# Patient Record
Sex: FEMALE | Race: WHITE | URBAN - METROPOLITAN AREA
[De-identification: names, ages, dates, MRNs, and addresses within clinical notes are randomized per-mention and may not be internally consistent; named-entity substitution may affect disease eponyms.]

---

## 2017-04-03 DIAGNOSIS — F32.0 MILD SINGLE CURRENT EPISODE OF MAJOR DEPRESSIVE DISORDER (H): ICD-10-CM

## 2017-04-03 DIAGNOSIS — F41.1 GAD (GENERALIZED ANXIETY DISORDER): ICD-10-CM

## 2017-04-04 ENCOUNTER — MYC MEDICAL ADVICE (OUTPATIENT)
Dept: FAMILY MEDICINE | Facility: CLINIC | Age: 22
End: 2017-04-04

## 2017-04-04 NOTE — TELEPHONE ENCOUNTER
Pending Prescriptions:                       Disp   Refills    sertraline (ZOLOFT) 50 MG tablet [Pharmacy*30 tab*0        Sig: TAKE ONE-HALF TABLET BY MOUTH ONCE DAILY FOR 1-2           WEEKS, THEN INCREASE TO ONE TABLET ONCE DAILY           Last Written Prescription Date: 11/04/2016  Last Fill Quantity: 30; # refills: 1  Last Office Visit with [facility], [facility] or [facility] prescribing provider:  11/04/2016        Last PHQ-9 score on record=   PHQ-9 SCORE 11/4/2016   Total Score 22       Lab Results   Component Value Date    AST 13 12/11/2015     Lab Results   Component Value Date    ALT 20 12/11/2015

## 2017-04-04 NOTE — TELEPHONE ENCOUNTER
Last OV 11/2016.  Patient was to follow up in 4-6 weeks.  See 3/13 Mychart encounter.  Patient informed about follow up.  Patient informed PN ok with telephone visit or Evisit - no follow through from patient.    Sent patient a [employer] message asking her to schedule appointment.  Nadeen Swanson RN

## 2017-04-07 NOTE — TELEPHONE ENCOUNTER
Patient has not read [employer] message  Left message for patient to callback with mom who answered the phone.  Nayeli MERCADO RN

## 2017-04-11 NOTE — TELEPHONE ENCOUNTER
Will have Julien call patient to schedule TE  Nadeen Swanson RN    Note from 3/13/17 [employer]:  Dr. Brian Lambert is willing to do an E-visit or a telephone visit.     E-visit is done via [employer].  You would need to resend the message below through the E-visit tab located in [employer].  These charges are not covered by some insurance plans     Telephone visit:  The charge for this type of visit will vary.  These charges are not covered by some insurance plans.   Below is a list of charges:     The first 10 minutes is $30   11 to 20 minutes is $59   21 to 30 minutes is $85     If you decide you want to do a telephone visit please let me know and we will schedule the appointment otherwise you can send you message as an E-visit.  Let us know if you have any further questions.  Thank you, RENNY Senior

## 2017-04-11 NOTE — TELEPHONE ENCOUNTER
Pt is calling back and would like to know if ph appt is possible because she lives 4 hrs. Please advise

## 2017-04-12 ENCOUNTER — VIRTUAL VISIT (OUTPATIENT)
Dept: FAMILY MEDICINE | Facility: CLINIC | Age: 22
End: 2017-04-12
Payer: COMMERCIAL

## 2017-04-12 DIAGNOSIS — F32.0 MILD SINGLE CURRENT EPISODE OF MAJOR DEPRESSIVE DISORDER (H): ICD-10-CM

## 2017-04-12 DIAGNOSIS — F41.1 GAD (GENERALIZED ANXIETY DISORDER): ICD-10-CM

## 2017-04-12 PROCEDURE — 99441 ZZC PHYSICIAN TELEPHONE EVALUATION 5-10 MIN: CPT | Performed by: FAMILY MEDICINE

## 2017-04-12 NOTE — MR AVS SNAPSHOT
After Visit Summary   4/12/2017    Torie Dennis    MRN: [id number]           Patient Information     Date Of Birth          1995        Visit Information        Provider Department      4/12/2017 12:30 PM Miri Torres, DO [facility]        Today's Diagnoses     THEE (generalized anxiety disorder)        Mild single current episode of major depressive disorder (H)           Follow-ups after your visit        Who to contact     If you have questions or need follow up information about today's clinic visit or your schedule please contact [facility] directly at [phone].  Normal or non-critical lab and imaging results will be communicated to you by [employer], letter or phone within 4 business days after the clinic has received the results. If you do not hear from us within 7 days, please contact the clinic through Ev[employer] or phone. If you have a critical or abnormal lab result, we will notify you by phone as soon as possible.  Submit refill requests through [employer] or call your pharmacy and they will forward the refill request to us. Please allow 3 business days for your refill to be completed.          Additional Information About Your Visit        MyChart Information     [employer] gives you secure access to your electronic health record. If you see a primary care provider, you can also send messages to your care team and make appointments. If you have questions, please call your primary care clinic.  If you do not have a primary care provider, please call [phone] and they will assist you.        Care EveryWhere ID     This is your Care EveryWhere ID. This could be used by other organizations to access your [place] medical records  [id number]         Blood Pressure from Last 3 Encounters:   11/04/16 106/74   12/11/15 111/72   04/30/15 115/73    Weight from Last 3 Encounters:   11/04/16 125 lb 3.2 oz (56.8 kg)   12/11/15 128 lb 1.6 oz (58.1 kg)   04/30/15 128 lb (58.1  kg)              Today, you had the following     No orders found for display         Today's Medication Changes          These changes are accurate as of: 4/12/17 12:59 PM.  If you have any questions, ask your nurse or doctor.               These medicines have changed or have updated prescriptions.        Dose/Directions    sertraline 50 MG tablet   Commonly known as:  ZOLOFT   This may have changed:    - how much to take  - how to take this  - when to take this  - additional instructions   Used for:  THEE (generalized anxiety disorder), Mild single current episode of major depressive disorder (H)        Dose:  100 mg   Take 2 tablets (100 mg) by mouth daily   Quantity:  60 tablet   Refills:  5            Where to get your medicines      These medications were sent to [id number] - [place], [place] - [address] AT [facility] [address], [place] [address]     Phone:  [phone]     sertraline 50 MG tablet                Primary Care Provider    None Specified       No primary provider on file.        Thank you!     Thank you for choosing [facility]  for your care. Our goal is always to provide you with excellent care. Hearing back from our patients is one way we can continue to improve our services. Please take a few minutes to complete the written survey that you may receive in the mail after your visit with us. Thank you!             Your Updated Medication List - Protect others around you: Learn how to safely use, store and throw away your medicines at www.disposemymeds.org.          This list is accurate as of: 4/12/17 12:59 PM.  Always use your most recent med list.                   Brand Name Dispense Instructions for use    minocycline 100 MG tablet    DYNACIN    120 tablet    Take 1 tablet (100 mg) by mouth 2 times daily Appointment needed before any further refills       sertraline 50 MG tablet    ZOLOFT    60 tablet    Take 2 tablets (100  mg) by mouth daily

## 2017-04-12 NOTE — PROGRESS NOTES
"Torie Dennis is a 22 year old female who is being evaluated via a telephone visit.      The patient has been notified of following:     \"This telephone visit will be conducted via a call between you and your physician/provider. We have found that certain health care needs can be provided without the need for a physical exam.  This service lets us provide the care you need with a short phone conversation.  If a prescription is necessary we can send it directly to your pharmacy.  If lab work is needed we can place an order for that and you can then stop by our lab to have the test done at a later time.    We will bill your insurance company for this service.  Please check with your medical insurance if this type of visit is covered. You may be responsible for the cost of this type of visit if insurance coverage is denied.  The typical cost is $30 (10min), $59 (11-20min) and $85 (21-30min).  Most often these visits are shorter than 10 minutes.    If during the course of the call the physician/provider feels a telephone visit is not appropriate, you will not be charged for this service.\"       Consent has been obtained for this service by 2 care team members: yes. See the scanned image in the medical record.    Torie Dennis complains of  Telephone      I have reviewed and updated the patient's Past Medical History, Social History, Family History and Medication List.    ALLERGIES  Paola Portillo, MA  [facility]     (MA signature)    Additional provider notes:   Took the zoloft for the first 1.5 months  Noticed that the depression and low feelings were improved within 3 weeks.  Stopped taking the medication because it made her feel tired and scattered all the time.  Yawning all the time  Tired by 8pm -     Went off the medication end of December -   Stayed off med  In February wanted to restart zoloft - because mood is down  Taking 50mg currently  Tried taking med both AM and PM and it didn't help " with the fatigue        Assessment/Plan:  (F41.1) THEE (generalized anxiety disorder)  Comment: anxiety and depressed mood  zoloft working but wonders about side effect of possible fatigue  Will increase dose because feel like this could be due to her depression and not a side effect  Will go to 100mg - Rx faxed  Discussed option of having her try lexapro or wellbutrin in the future if this does not work  Plan:      (F32.0) Mild single current episode of major depressive disorder (H)  Comment: as above  Plan:  a       I have reviewed the note as documented above.  This accurately captures the substance of my conversation with the patient,  Torie Dennis     Total time of call between patient and provider was 10 minutes

## 2017-04-13 ASSESSMENT — PATIENT HEALTH QUESTIONNAIRE - PHQ9: SUM OF ALL RESPONSES TO PHQ QUESTIONS 1-9: 15

## 2017-05-26 ENCOUNTER — MYC MEDICAL ADVICE (OUTPATIENT)
Dept: FAMILY MEDICINE | Facility: CLINIC | Age: 22
End: 2017-05-26

## 2017-05-30 ENCOUNTER — TELEPHONE (OUTPATIENT)
Dept: FAMILY MEDICINE | Facility: CLINIC | Age: 22
End: 2017-05-30

## 2017-05-30 NOTE — TELEPHONE ENCOUNTER
Huddled with PN  Her schedule is full tomorrow; can't do office or phone visit.  Then not in office 6/1  E-Visit would be best at this time.  Left detailed VM informing pt  Nayeli MERCADO RN

## 2017-05-30 NOTE — TELEPHONE ENCOUNTER
Reason for Call:  Other call back    Detailed comments: pt wants a phone visit with the  Since   She cannot come in on June 1. The phone visit has to be before or on  June 1.[place] going [place] 3 [place].    Phone Number Patient can be reached at: Home number on file [phone] (home)    Best Time: any    Can we leave a detailed message on this number? YES    Call taken on 5/30/2017 at 4:56 PM by Jessi Shirley

## 2017-06-17 ENCOUNTER — MYC MEDICAL ADVICE (OUTPATIENT)
Dept: FAMILY MEDICINE | Facility: CLINIC | Age: 22
End: 2017-06-17

## 2017-06-19 NOTE — TELEPHONE ENCOUNTER
PN,  FYI:  See below message  Pt has telephone visit scheduled with you for 6/23/2017 at 3pm.  Nayeli MERCADO RN

## 2017-06-23 ENCOUNTER — VIRTUAL VISIT (OUTPATIENT)
Dept: FAMILY MEDICINE | Facility: CLINIC | Age: 22
End: 2017-06-23
Payer: COMMERCIAL

## 2017-06-23 DIAGNOSIS — F41.1 GAD (GENERALIZED ANXIETY DISORDER): Primary | ICD-10-CM

## 2017-06-23 DIAGNOSIS — F34.1 DYSTHYMIA: ICD-10-CM

## 2017-06-23 PROCEDURE — 99441 ZZC PHYSICIAN TELEPHONE EVALUATION 5-10 MIN: CPT | Performed by: FAMILY MEDICINE

## 2017-06-23 RX ORDER — ESCITALOPRAM OXALATE 10 MG/1
10 TABLET ORAL DAILY
Qty: 30 TABLET | Refills: 1 | Status: SHIPPED | OUTPATIENT
Start: 2017-06-23 | End: 2017-10-02

## 2017-06-23 NOTE — PROGRESS NOTES
"Torie Dennis is a 22 year old female who is being evaluated via a telephone visit.      The patient has been notified of following:     \"This telephone visit will be conducted via a call between you and your physician/provider. We have found that certain health care needs can be provided without the need for a physical exam.  This service lets us provide the care you need with a short phone conversation.  If a prescription is necessary we can send it directly to your pharmacy.  If lab work is needed we can place an order for that and you can then stop by our lab to have the test done at a later time.    We will bill your insurance company for this service.  Please check with your medical insurance if this type of visit is covered. You may be responsible for the cost of this type of visit if insurance coverage is denied.  The typical cost is $30 (10min), $59 (11-20min) and $85 (21-30min).  Most often these visits are shorter than 10 minutes.    If during the course of the call the physician/provider feels a telephone visit is not appropriate, you will not be charged for this service.\"       Consent has been obtained for this service by 2 care team members: yes. See the scanned image in the medical record.    Torie Dennis complains of  Telephone (Depression)      I have reviewed and updated the patient's Past Medical History, Social History, Family History and Medication List.    ALLERGIES  Nuts    NAMITA Alonzo  (MA signature)    Additional provider notes:    Since her last visit the zoloft has not helped  Felt like the higher dose made her feel spacey and other side effects  Emotionally felt like she was better.  Took the higher dose for a little over a month - stopped at that time because it wasn't helping.  Low energy and lack of focus persisting but mood was better.      Assessment/Plan:  (F41.1) THEE (generalized anxiety disorder)  (primary encounter diagnosis)  Comment: stopped the zoloft  Will try the " lexapro -   If working call for refills (can do PHQ and THEE by mychart)  If the med not working pt to schedule another telephone visit.  Plan: escitalopram (LEXAPRO) 10 MG tablet             (F34.1) Dysthymia  Comment:    Plan: escitalopram (LEXAPRO) 10 MG tablet                I have reviewed the note as documented above.  This accurately captures the substance of my conversation with the patient,   Torie Dennis     Total time of call between patient and provider was 10 minutes

## 2017-06-23 NOTE — MR AVS SNAPSHOT
After Visit Summary   6/23/2017    Torie Dennis    MRN: [id number]           Patient Information     Date Of Birth          1995        Visit Information        Provider Department      6/23/2017 3:00 PM Miri Torres, DO [facility]        Today's Diagnoses     THEE (generalized anxiety disorder)    -  1    Dysthymia           Follow-ups after your visit        Who to contact     If you have questions or need follow up information about today's clinic visit or your schedule please contact [facility] directly at [phone].  Normal or non-critical lab and imaging results will be communicated to you by [employer]on[employer], letter or phone within 4 business days after the clinic has received the results. If you do not hear from us within 7 days, please contact the clinic through [employer] or phone. If you have a critical or abnormal lab result, we will notify you by phone as soon as possible.  Submit refill requests through [employer] or call your pharmacy and they will forward the refill request to us. Please allow 3 business days for your refill to be completed.          Additional Information About Your Visit        MyChart Information     [employer] gives you secure access to your electronic health record. If you see a primary care provider, you can also send messages to your care team and make appointments. If you have questions, please call your primary care clinic.  If you do not have a primary care provider, please call [phone] and they will assist you.        Care EveryWhere ID     This is your Care EveryWhere ID. This could be used by other organizations to access your [place] medical records  [id number]         Blood Pressure from Last 3 Encounters:   11/04/16 106/74   12/11/15 111/72   04/30/15 115/73    Weight from Last 3 Encounters:   11/04/16 125 lb 3.2 oz (56.8 kg)   12/11/15 128 lb 1.6 oz (58.1 kg)   04/30/15 128 lb (58.1 kg)              Today, you had the following      No orders found for display         Today's Medication Changes          These changes are accurate as of: 6/23/17  3:13 PM.  If you have any questions, ask your nurse or doctor.               Start taking these medicines.        Dose/Directions    escitalopram 10 MG tablet   Commonly known as:  LEXAPRO   Used for:  THEE (generalized anxiety disorder), Dysthymia        Dose:  10 mg   Take 1 tablet (10 mg) by mouth daily Start 1/2 tablet daily for 1-2 weeks, then increase to 1 tablet daily   Quantity:  30 tablet   Refills:  1            Where to get your medicines      These medications were sent to [id number] - [place], [place] - [address] [facility]  [place]  [address] [facility] [address]    Hours:  24-hours Phone:  [phone]     escitalopram 10 MG tablet                Primary Care Provider    None Specified       No primary provider on file.        Equal Access to Services     [facility]: Hadmitch Rome, saad rutherford, gabe kaalmajihan salinas, fabiana pinto . So [facility] [phone].    ATENCIÓN: Si habla español, tiene a burns disposición servicios gratuitos de asistencia lingüística. Llame al [phone].    We comply with applicable federal civil rights laws and [place] laws. We do not discriminate on the basis of race, color, national origin, age, disability sex, sexual orientation or gender identity.            Thank you!     Thank you for choosing [facility]  for your care. Our goal is always to provide you with excellent care. Hearing back from our patients is one way we can continue to improve our services. Please take a few minutes to complete the written survey that you may receive in the mail after your visit with us. Thank you!             Your Updated Medication List - Protect others around you: Learn how to safely use, store and throw away your medicines at www.disposemymeds.org.          This list  is accurate as of: 6/23/17  3:13 PM.  Always use your most recent med list.                   Brand Name Dispense Instructions for use Diagnosis    escitalopram 10 MG tablet    LEXAPRO    30 tablet    Take 1 tablet (10 mg) by mouth daily Start 1/2 tablet daily for 1-2 weeks, then increase to 1 tablet daily    THEE (generalized anxiety disorder), Dysthymia       minocycline 100 MG tablet    DYNACIN    120 tablet    Take 1 tablet (100 mg) by mouth 2 times daily Appointment needed before any further refills    Acne vulgaris       sertraline 50 MG tablet    ZOLOFT    60 tablet    Take 2 tablets (100 mg) by mouth daily    THEE (generalized anxiety disorder), Mild single current episode of major depressive disorder (H)

## 2017-06-24 ASSESSMENT — PATIENT HEALTH QUESTIONNAIRE - PHQ9: SUM OF ALL RESPONSES TO PHQ QUESTIONS 1-9: 15

## 2017-07-23 ENCOUNTER — MYC MEDICAL ADVICE (OUTPATIENT)
Dept: FAMILY MEDICINE | Facility: CLINIC | Age: 22
End: 2017-07-23

## 2017-07-24 NOTE — TELEPHONE ENCOUNTER
PN  Please see [employer] message below.  Do you want to do another virtual visit?  Last 6/23/17 and 4/12/17  Ndaeen Swanson RN

## 2017-08-08 ENCOUNTER — VIRTUAL VISIT (OUTPATIENT)
Dept: FAMILY MEDICINE | Facility: CLINIC | Age: 22
End: 2017-08-08
Payer: COMMERCIAL

## 2017-08-08 DIAGNOSIS — F34.1 DYSTHYMIA: Primary | ICD-10-CM

## 2017-08-08 DIAGNOSIS — F41.1 GAD (GENERALIZED ANXIETY DISORDER): ICD-10-CM

## 2017-08-08 PROCEDURE — 99442 ZZC PHYSICIAN TELEPHONE EVALUATION 11-20 MIN: CPT | Performed by: FAMILY MEDICINE

## 2017-08-08 RX ORDER — BUPROPION HYDROCHLORIDE 150 MG/1
TABLET, EXTENDED RELEASE ORAL
Qty: 60 TABLET | Refills: 1 | Status: SHIPPED | OUTPATIENT
Start: 2017-08-08 | End: 2017-10-02

## 2017-08-08 ASSESSMENT — ANXIETY QUESTIONNAIRES
GAD7 TOTAL SCORE: 12
2. NOT BEING ABLE TO STOP OR CONTROL WORRYING: MORE THAN HALF THE DAYS
5. BEING SO RESTLESS THAT IT IS HARD TO SIT STILL: SEVERAL DAYS
3. WORRYING TOO MUCH ABOUT DIFFERENT THINGS: MORE THAN HALF THE DAYS
6. BECOMING EASILY ANNOYED OR IRRITABLE: MORE THAN HALF THE DAYS
1. FEELING NERVOUS, ANXIOUS, OR ON EDGE: MORE THAN HALF THE DAYS
7. FEELING AFRAID AS IF SOMETHING AWFUL MIGHT HAPPEN: SEVERAL DAYS
IF YOU CHECKED OFF ANY PROBLEMS ON THIS QUESTIONNAIRE, HOW DIFFICULT HAVE THESE PROBLEMS MADE IT FOR YOU TO DO YOUR WORK, TAKE CARE OF THINGS AT HOME, OR GET ALONG WITH OTHER PEOPLE: SOMEWHAT DIFFICULT

## 2017-08-08 ASSESSMENT — PATIENT HEALTH QUESTIONNAIRE - PHQ9
5. POOR APPETITE OR OVEREATING: MORE THAN HALF THE DAYS
SUM OF ALL RESPONSES TO PHQ QUESTIONS 1-9: 17

## 2017-08-08 NOTE — MR AVS SNAPSHOT
After Visit Summary   8/8/2017    Torie Dennis    MRN: [id number]           Patient Information     Date Of Birth          1995        Visit Information        Provider Department      8/8/2017 3:30 PM Miri Torres, DO [facility]        Today's Diagnoses     Dysthymia    -  1    THEE (generalized anxiety disorder)           Follow-ups after your visit        Who to contact     If you have questions or need follow up information about today's clinic visit or your schedule please contact [facility] directly at [phone].  Normal or non-critical lab and imaging results will be communicated to you by Time To C[employer]hart, letter or phone within 4 business days after the clinic has received the results. If you do not hear from us within 7 days, please contact the clinic through [employer] or phone. If you have a critical or abnormal lab result, we will notify you by phone as soon as possible.  Submit refill requests through [employer] or call your pharmacy and they will forward the refill request to us. Please allow 3 business days for your refill to be completed.          Additional Information About Your Visit        MyChart Information     [employer] gives you secure access to your electronic health record. If you see a primary care provider, you can also send messages to your care team and make appointments. If you have questions, please call your primary care clinic.  If you do not have a primary care provider, please call [phone] and they will assist you.        Care EveryWhere ID     This is your Care EveryWhere ID. This could be used by other organizations to access your [place] medical records  [id number]         Blood Pressure from Last 3 Encounters:   11/04/16 106/74   12/11/15 111/72   04/30/15 115/73    Weight from Last 3 Encounters:   11/04/16 125 lb 3.2 oz (56.8 kg)   12/11/15 128 lb 1.6 oz (58.1 kg)   04/30/15 128 lb (58.1 kg)              Today, you had the following      No orders found for display         Today's Medication Changes          These changes are accurate as of: 8/8/17  4:12 PM.  If you have any questions, ask your nurse or doctor.               Start taking these medicines.        Dose/Directions    buPROPion 150 MG 12 hr tablet   Commonly known as:  WELLBUTRIN SR   Used for:  Dysthymia, THEE (generalized anxiety disorder)        Take 1 tablet once daily and increase to 1 tablet twice daily after 4 to 7 days   Quantity:  60 tablet   Refills:  1            Where to get your medicines      These medications were sent to [id number] - [place], [place] - [address] AT [address] [facility]  [address], [facility] [place] [address]    Hours:  24-hours Phone:  [phone]     buPROPion 150 MG 12 hr tablet                Primary Care Provider Office Phone # Fax #    Miri GARCIA DO Brian [phone] [address] [address]        Equal Access to Services     [facility]: Hadii aad ku hadasho Soomaali, waaxda luqadaha, qaybta kaalmada adeegyada, waxay idiin hayaan brittney pinto . So [facility] [phone].    ATENCIÓN: Si habla español, tiene a burns disposición servicios gratuitos de asistencia lingüística. [facility] [phone].    We comply with applicable federal civil rights laws and [place] laws. We do not discriminate on the basis of race, color, national origin, age, disability sex, sexual orientation or gender identity.            Thank you!     Thank you for choosing [facility]  for your care. Our goal is always to provide you with excellent care. Hearing back from our patients is one way we can continue to improve our services. Please take a few minutes to complete the written survey that you may receive in the mail after your visit with us. Thank you!             Your Updated Medication List - Protect others around you: Learn how to safely use, store and throw away your medicines at  www.disposemymeds.org.          This list is accurate as of: 8/8/17  4:12 PM.  Always use your most recent med list.                   Brand Name Dispense Instructions for use Diagnosis    buPROPion 150 MG 12 hr tablet    WELLBUTRIN SR    60 tablet    Take 1 tablet once daily and increase to 1 tablet twice daily after 4 to 7 days    Dysthymia, THEE (generalized anxiety disorder)       escitalopram 10 MG tablet    LEXAPRO    30 tablet    Take 1 tablet (10 mg) by mouth daily Start 1/2 tablet daily for 1-2 weeks, then increase to 1 tablet daily    THEE (generalized anxiety disorder), Dysthymia

## 2017-08-08 NOTE — PROGRESS NOTES
"Torie Dennis is a 22 year old female who is being evaluated via a telephone visit.      The patient has been notified of following:     \"This telephone visit will be conducted via a call between you and your physician/provider. We have found that certain health care needs can be provided without the need for a physical exam.  This service lets us provide the care you need with a short phone conversation.  If a prescription is necessary we can send it directly to your pharmacy.  If lab work is needed we can place an order for that and you can then stop by our lab to have the test done at a later time.    We will bill your insurance company for this service.  Please check with your medical insurance if this type of visit is covered. You may be responsible for the cost of this type of visit if insurance coverage is denied.  The typical cost is $30 (10min), $59 (11-20min) and $85 (21-30min).  Most often these visits are shorter than 10 minutes.    If during the course of the call the physician/provider feels a telephone visit is not appropriate, you will not be charged for this service.\"       Consent has been obtained for this service by 2 care team members: yes. See the scanned image in the medical record.    Torie Dennis complains of  Anxiety      I have reviewed and updated the patient's Past Medical History, Social History, Family History and Medication List.    ALLERGIES  Nuts    NAMITA Alonzo  (MA signature)    Additional provider notes:      Tried the lexapro -   Took the medication for 2 weeks and had side effects of fatigue and felt off.  Was unproductive.  Was not able to sleep on the medication.   Was staying in bed and struggling to get out of bed.  Also tried zoloft.    Symptoms -   Insomnia - also had daytime sleepiness  Has been nauseous - unsure if it was the lexapro.  Lost 3-5 pounds.  Trouble concentrating    Has some anxiety - aggravating outside source and depending on day can cope or not able " to cope with it.  At other times will have anxiety without any underlying cause.      ETOH - socially    Will be starting school again         Assessment/Plan:  (F34.1) Dysthymia  (primary encounter diagnosis)  Comment: will try other med as SSRI do not seem to help  No signs of bipolar but reviewed with patient  Plan: buPROPion (WELLBUTRIN SR) 150 MG 12 hr tablet         Call for med refill if working or phone visit to discuss medication change     (F41.1) THEE (generalized anxiety disorder)  Comment:    Plan: buPROPion (WELLBUTRIN SR) 150 MG 12 hr tablet                I have reviewed the note as documented above.  This accurately captures the substance of my conversation with the patient,  Torie Dennis     Total time of call between patient and provider was 15 minutes

## 2017-08-09 ASSESSMENT — ANXIETY QUESTIONNAIRES: GAD7 TOTAL SCORE: 12

## 2017-10-02 ENCOUNTER — TELEPHONE (OUTPATIENT)
Dept: FAMILY MEDICINE | Facility: CLINIC | Age: 22
End: 2017-10-02

## 2017-10-02 ENCOUNTER — OFFICE VISIT (OUTPATIENT)
Dept: FAMILY MEDICINE | Facility: CLINIC | Age: 22
End: 2017-10-02
Payer: COMMERCIAL

## 2017-10-02 VITALS
HEIGHT: 67 IN | HEART RATE: 58 BPM | OXYGEN SATURATION: 100 % | BODY MASS INDEX: 19.78 KG/M2 | DIASTOLIC BLOOD PRESSURE: 65 MMHG | RESPIRATION RATE: 16 BRPM | WEIGHT: 126 LBS | TEMPERATURE: 97.4 F | SYSTOLIC BLOOD PRESSURE: 101 MMHG

## 2017-10-02 DIAGNOSIS — Z00.00 ROUTINE GENERAL MEDICAL EXAMINATION AT A HEALTH CARE FACILITY: Primary | ICD-10-CM

## 2017-10-02 DIAGNOSIS — Z23 NEED FOR TDAP VACCINATION: ICD-10-CM

## 2017-10-02 DIAGNOSIS — R41.840 LACK OF CONCENTRATION: Primary | ICD-10-CM

## 2017-10-02 DIAGNOSIS — Z23 NEED FOR INFLUENZA VACCINATION: ICD-10-CM

## 2017-10-02 DIAGNOSIS — F34.1 DYSTHYMIA: ICD-10-CM

## 2017-10-02 DIAGNOSIS — R41.840 LACK OF CONCENTRATION: ICD-10-CM

## 2017-10-02 DIAGNOSIS — F41.1 GAD (GENERALIZED ANXIETY DISORDER): ICD-10-CM

## 2017-10-02 PROCEDURE — 90715 TDAP VACCINE 7 YRS/> IM: CPT | Performed by: PHYSICIAN ASSISTANT

## 2017-10-02 PROCEDURE — 90686 IIV4 VACC NO PRSV 0.5 ML IM: CPT | Performed by: PHYSICIAN ASSISTANT

## 2017-10-02 PROCEDURE — 90472 IMMUNIZATION ADMIN EACH ADD: CPT | Performed by: PHYSICIAN ASSISTANT

## 2017-10-02 PROCEDURE — 99395 PREV VISIT EST AGE 18-39: CPT | Mod: 25 | Performed by: PHYSICIAN ASSISTANT

## 2017-10-02 PROCEDURE — 90471 IMMUNIZATION ADMIN: CPT | Performed by: PHYSICIAN ASSISTANT

## 2017-10-02 PROCEDURE — 99213 OFFICE O/P EST LOW 20 MIN: CPT | Mod: 25 | Performed by: PHYSICIAN ASSISTANT

## 2017-10-02 ASSESSMENT — ANXIETY QUESTIONNAIRES
1. FEELING NERVOUS, ANXIOUS, OR ON EDGE: NEARLY EVERY DAY
GAD7 TOTAL SCORE: 18
3. WORRYING TOO MUCH ABOUT DIFFERENT THINGS: NEARLY EVERY DAY
7. FEELING AFRAID AS IF SOMETHING AWFUL MIGHT HAPPEN: NOT AT ALL
2. NOT BEING ABLE TO STOP OR CONTROL WORRYING: NEARLY EVERY DAY
6. BECOMING EASILY ANNOYED OR IRRITABLE: NEARLY EVERY DAY
IF YOU CHECKED OFF ANY PROBLEMS ON THIS QUESTIONNAIRE, HOW DIFFICULT HAVE THESE PROBLEMS MADE IT FOR YOU TO DO YOUR WORK, TAKE CARE OF THINGS AT HOME, OR GET ALONG WITH OTHER PEOPLE: EXTREMELY DIFFICULT
5. BEING SO RESTLESS THAT IT IS HARD TO SIT STILL: NEARLY EVERY DAY

## 2017-10-02 ASSESSMENT — PATIENT HEALTH QUESTIONNAIRE - PHQ9
SUM OF ALL RESPONSES TO PHQ QUESTIONS 1-9: 22
5. POOR APPETITE OR OVEREATING: NEARLY EVERY DAY

## 2017-10-02 NOTE — MR AVS SNAPSHOT
After Visit Summary   10/2/2017    Torie Dennis    MRN: [id number]           Patient Information     Date Of Birth          1995        Visit Information        Provider Department      10/2/2017 9:40 AM Ben Foster PA-C [facility]        Today's Diagnoses     Lack of concentration    -  1    Dysthymia        THEE (generalized anxiety disorder)        Need for Tdap vaccination          Care Instructions      Preventive Health Recommendations  Female Ages 18 to 25     Yearly exam:     See your health care provider every year in order to  o Review health changes.   o Discuss preventive care.    o Review your medicines if your doctor has prescribed any.      You should be tested each year for STDs (sexually transmitted diseases).       After age 20, talk to your provider about how often you should have cholesterol testing.      Starting at age 21, get a Pap test every three years. If you have an abnormal result, your doctor may have you test more often.      If you are at risk for diabetes, you should have a diabetes test (fasting glucose).     Shots:     Get a flu shot each year.     Get a tetanus shot every 10 years.     Consider getting the shot (vaccine) that prevents cervical cancer (Gardasil).    Nutrition:     Eat at least 5 servings of fruits and vegetables each day.    Eat whole-grain bread, whole-wheat pasta and brown rice instead of white grains and rice.    Talk to your provider about Calcium and Vitamin D.     Lifestyle    Exercise at least 150 minutes a week each week (30 minutes a day, 5 days a week). This will help you control your weight and prevent disease.    Limit alcohol to one drink per day.    No smoking.     Wear sunscreen to prevent skin cancer.    See your dentist every six months for an exam and cleaning.          Follow-ups after your visit        Additional Services     MENTAL HEALTH REFERRAL       Your provider has referred you to: VERONICA: Elisa  Counseling Services - ADHD & Bariatric Assessments - Adult ADHD Evaluations - [facility] [phone]   [email or web address]   *Please call to schedule an appointment.    All scheduling is subject to the client's specific insurance plan & benefits, provider/location availability, and provider clinical specialities.  Please arrive 15 minutes early for your first appointment and bring your completed paperwork.    Please be aware that coverage of these services is subject to the terms and limitations of your health insurance plan.  Call member services at your health plan with any benefit or coverage questions.                  Who to contact     If you have questions or need follow up information about today's clinic visit or your schedule please contact [facility] directly at [phone].  Normal or non-critical lab and imaging results will be communicated to you by Zervanthart, letter or phone within 4 business days after the clinic has received the results. If you do not hear from us within 7 days, please contact the clinic through Zervanthart or phone. If you have a critical or abnormal lab result, we will notify you by phone as soon as possible.  Submit refill requests through Carnad or call your pharmacy and they will forward the refill request to us. Please allow 3 business days for your refill to be completed.          Additional Information About Your Visit        Carnad Information     Carnad gives you secure access to your electronic health record. If you see a primary care provider, you can also send messages to your care team and make appointments. If you have questions, please call your primary care clinic.  If you do not have a primary care provider, please call [phone] and they will assist you.        Care EveryWhere ID     This is your Care EveryWhere ID. This could be used by other organizations to access your  "[place] medical records  [id number]        Your Vitals Were     Pulse Temperature Respirations Height Last Period Pulse Oximetry    58 97.4  F (36.3  C) (Oral) 16 5' 7\" (1.702 m) 10/01/2017 100%    BMI (Body Mass Index)                   19.73 kg/m2            Blood Pressure from Last 3 Encounters:   10/02/17 101/65   11/04/16 106/74   12/11/15 111/72    Weight from Last 3 Encounters:   10/02/17 126 lb (57.2 kg)   11/04/16 125 lb 3.2 oz (56.8 kg)   12/11/15 128 lb 1.6 oz (58.1 kg)              We Performed the Following     MENTAL HEALTH REFERRAL     TDAP VACCINE (ADACEL)        Primary Care Provider Office Phone # Fax #    Miri GARCIA DO Brian [phone] [address] [address]        Equal Access to Services     CÉSAR [facility] HospitalJOHN : Hadii aad ku hadasho Soomaali, waaxda luqadaha, qaybta kaalmada adeegyada, waxay idiin haynicholen brittney rosearaclyde pinto . So [facility] [phone].    ATENCIÓN: Si habla español, tiene a burns disposición servicios gratuitos de asistencia lingüística. Llame al [phone].    We comply with applicable federal civil rights laws and [place] laws. We do not discriminate on the basis of race, color, national origin, age, disability, sex, sexual orientation, or gender identity.            Thank you!     Thank you for choosing [facility]  for your care. Our goal is always to provide you with excellent care. Hearing back from our patients is one way we can continue to improve our services. Please take a few minutes to complete the written survey that you may receive in the mail after your visit with us. Thank you!             Your Updated Medication List - Protect others around you: Learn how to safely use, store and throw away your medicines at www.disposemymeds.org.      Notice  As of 10/2/2017 10:10 AM    You have not been prescribed any medications.      "

## 2017-10-02 NOTE — NURSING NOTE
"Chief Complaint   Patient presents with     Physical     /65  Pulse 58  Temp 97.4  F (36.3  C) (Oral)  Resp 16  Ht 5' 7\" (1.702 m)  Wt 126 lb (57.2 kg)  LMP 10/01/2017  SpO2 100%  BMI 19.73 kg/m2 Estimated body mass index is 19.73 kg/(m^2) as calculated from the following:    Height as of this encounter: 5' 7\" (1.702 m).    Weight as of this encounter: 126 lb (57.2 kg).  bp completed using cuff size: regular       Health Maintenance addressed:  Pap Smear    Possibly completing today per provider review.    Katie Abarca MA     "

## 2017-10-02 NOTE — PROGRESS NOTES
"   SUBJECTIVE:   CC: Torie Dennis is an 22 year old woman who presents for preventive health visit.     Healthy Habits:    Do you get at least three servings of calcium containing foods daily (dairy, green leafy vegetables, etc.)? yes    Amount of exercise or daily activities, outside of work: 2-3 day(s) per week    Problems taking medications regularly No    Medication side effects: No    Have you had an eye exam in the past two years? yes    Do you see a dentist twice per year? yes    Do you have sleep apnea, excessive snoring or daytime drowsiness?yes        Depression Followup    Status since last visit: Same     See PHQ-9 for current symptoms.  Other associated symptoms: None    Complicating factors:   Significant life event:  No   Current substance abuse:  None  Anxiety or Panic symptoms:  Yes-  Anxiety     PHQ-9  English  PHQ-9   Any Language    Over the last year, she has started to get increase in her depression and anxiety.  She has had symptoms going back into high school, but was always just \"personality.\"  There is some mental health in the family.  She has tried to be more physically active.  She has never done any counseling.  She has never been screened for ADHD.  She has tried several different meds, including 2 SSRI.  zoloft did make the most different on her mood. She explains that she was very placid, flat, and no emotion one way or another.  wellbutrin made her feel quite bad.  More angry and short fused.        Today's PHQ-2 Score: PHQ-2 ( 1999 Pfizer) 10/2/2017 12/11/2015   Q1: Little interest or pleasure in doing things 1 2   Q2: Feeling down, depressed or hopeless 1 2   PHQ-2 Score 2 4         Abuse: Current or Past(Physical, Sexual or Emotional)- No  Do you feel safe in your environment - Yes  Social History   Substance Use Topics     Smoking status: Never Smoker     Smokeless tobacco: Never Used     Alcohol use 0.0 oz/week     0 Standard drinks or equivalent per week      Comment: " "socially     The patient does not drink >3 drinks per day nor >7 drinks per week.    Reviewed orders with patient.  Reviewed health maintenance and updated orders accordingly - Yes  BP Readings from Last 3 Encounters:   10/02/17 101/65   11/04/16 106/74   12/11/15 111/72    Wt Readings from Last 3 Encounters:   10/02/17 126 lb (57.2 kg)   11/04/16 125 lb 3.2 oz (56.8 kg)   12/11/15 128 lb 1.6 oz (58.1 kg)                      Mammogram not appropriate for this patient based on age.    Pertinent mammograms are reviewed under the imaging tab.  History of abnormal Pap smear: NO - age 21-29 PAP every 3 years recommended  Patient is on period and would like to return for pap.    Reviewed and updated as needed this visit by clinical staffTobacco  Allergies  Meds  Med Hx  Surg Hx  Fam Hx  Soc Hx        Reviewed and updated as needed this visit by Provider              ROS:  C: NEGATIVE for fever, chills, change in weight  I: NEGATIVE for worrisome rashes, moles or lesions  E: NEGATIVE for vision changes or irritation  ENT: NEGATIVE for ear, mouth and throat problems  R: NEGATIVE for significant cough or SOB  B: NEGATIVE for masses, tenderness or discharge  CV: NEGATIVE for chest pain, palpitations or peripheral edema  GI: NEGATIVE for nausea, abdominal pain, heartburn, or change in bowel habits  : NEGATIVE for unusual urinary or vaginal symptoms. Periods are regular.  M: NEGATIVE for significant arthralgias or myalgia  N: NEGATIVE for weakness, dizziness or paresthesias  P: NEGATIVE for changes in mood or affect    OBJECTIVE:   /65  Pulse 58  Temp 97.4  F (36.3  C) (Oral)  Resp 16  Ht 5' 7\" (1.702 m)  Wt 126 lb (57.2 kg)  LMP 10/01/2017  SpO2 100%  BMI 19.73 kg/m2  EXAM:  GENERAL: alert and no distress  EYES: Eyes grossly normal to inspection, PERRL and conjunctivae and sclerae normal  HENT: ear canals and TM's normal, nose and mouth without ulcers or lesions  NECK: no adenopathy, no asymmetry, " "masses, or scars and thyroid normal to palpation  RESP: lungs clear to auscultation - no rales, rhonchi or wheezes  CV: regular rate and rhythm, normal S1 S2, no S3 or S4, no murmur, click or rub, no peripheral edema and peripheral pulses strong  MS: no gross musculoskeletal defects noted, no edema  SKIN: no suspicious lesions or rashes  PSYCH: mentation appears normal, affect normal/bright    ASSESSMENT/PLAN:   1. Routine general medical examination at a health care facility  Patient is going to return for pap/pelvic exam.  Patient is on period now.    2. Lack of concentration  Patient is interested in further evaluation for possible ADHD.  I do agree this is warranted, as she has failed multiple medications for depression/anxiety.  - MENTAL HEALTH REFERRAL    3. Dysthymia  As above  - MENTAL HEALTH REFERRAL    4. THEE (generalized anxiety disorder)      5. Need for Tdap vaccination    - TDAP VACCINE (ADACEL)    6. Need for influenza vaccination    - HC FLU VAC PRESRV FREE QUAD SPLIT VIR 3+YRS IM    COUNSELING:   Reviewed preventive health counseling, as reflected in patient instructions       Regular exercise       Healthy diet/nutrition         reports that she has never smoked. She has never used smokeless tobacco.    Estimated body mass index is 19.73 kg/(m^2) as calculated from the following:    Height as of this encounter: 5' 7\" (1.702 m).    Weight as of this encounter: 126 lb (57.2 kg).         Counseling Resources:  ATP IV Guidelines  Pooled Cohorts Equation Calculator  Breast Cancer Risk Calculator  FRAX Risk Assessment  ICSI Preventive Guidelines  Dietary Guidelines for Americans, 2010  USDA's MyPlate  ASA Prophylaxis  Lung CA Screening    Ben Foster PA-C  [facility]  "

## 2017-10-02 NOTE — TELEPHONE ENCOUNTER
Reason for Call:  call back    Detailed comments: Pt calling in wondering if Dada could recommend any other clinics in the area that offer same services as [facility]. Pt states that they are booked too far out, she had called and they suggested that she seek other options as well. Please call to advise.     Phone Number Patient can be reached at: Cell number on file:    Telephone Information:   Mobile [phone]       Best Time: any    Can we leave a detailed message on this number? YES    Call taken on 10/2/2017 at 10:56 AM by Laina Cartwright

## 2017-10-02 NOTE — TELEPHONE ENCOUNTER
She can contact [facility].  There are several locations around the [facility], and may have less of a wait time.    Dada Foster PA-C

## 2017-10-03 ENCOUNTER — MYC MEDICAL ADVICE (OUTPATIENT)
Dept: FAMILY MEDICINE | Facility: CLINIC | Age: 22
End: 2017-10-03

## 2017-10-03 ASSESSMENT — ANXIETY QUESTIONNAIRES: GAD7 TOTAL SCORE: 18

## 2017-10-06 LAB — PHQ9 SCORE: 19

## 2017-10-09 ENCOUNTER — TRANSFERRED RECORDS (OUTPATIENT)
Dept: HEALTH INFORMATION MANAGEMENT | Facility: CLINIC | Age: 22
End: 2017-10-09

## 2017-10-09 LAB — PHQ9 SCORE: 17

## 2017-12-24 ENCOUNTER — HEALTH MAINTENANCE LETTER (OUTPATIENT)
Age: 22
End: 2017-12-24

## 2018-09-21 ENCOUNTER — TELEPHONE (OUTPATIENT)
Dept: FAMILY MEDICINE | Facility: CLINIC | Age: 23
End: 2018-09-21

## 2018-09-21 NOTE — LETTER
September 21, 2018    Troie Dennis  [address]  [place] [address]      Dear Elisa Vogt cares about your health and your health plan.  I have reviewed your medical conditions, medication list and lab results, and am making recommendations based on this review to better manage your health.    You are in particular need of attention regarding:  -Cervical Cancer Screening  -Wellness (Physical) Visit   -Medication follow up    I am recommending that you:     -schedule a WELLNESS (Physical) APPOINTMENT with me.   I will check fasting labs the same day - nothing to eat except water and meds for 8-10 hours prior.      -schedule a PAP SMEAR EXAM which is due.  Please disregard this reminder if you have had this exam elsewhere within the last year.  It would be helpful for us to have a copy of your recent pap smear report in our file so that we can best coordinate your care.    If you are under/uninsured, we recommend you contact the [facility]. They offer pap smears at no charge or on a sliding fee charge. You can schedule with them at [phone]. Please have them send us the results.      Please call us at the [facility]: [phone] or use Comfyware to address the above recommendations.     Thank you for trusting [facility].  We appreciate the opportunity to serve you and look forward to supporting your healthcare in the future.    If you have (or plan to have) any of these tests done at a facility other than a [facility] or a [facility], please have the results sent to the [facility].               Healthy Regards,      Shawnee Torres, DO

## 2019-02-01 ENCOUNTER — OFFICE VISIT (OUTPATIENT)
Dept: FAMILY MEDICINE | Facility: CLINIC | Age: 24
End: 2019-02-01
Payer: COMMERCIAL

## 2019-02-01 VITALS
OXYGEN SATURATION: 100 % | HEART RATE: 83 BPM | BODY MASS INDEX: 19.84 KG/M2 | WEIGHT: 126.4 LBS | TEMPERATURE: 98.4 F | RESPIRATION RATE: 16 BRPM | HEIGHT: 67 IN | DIASTOLIC BLOOD PRESSURE: 85 MMHG | SYSTOLIC BLOOD PRESSURE: 126 MMHG

## 2019-02-01 DIAGNOSIS — Z30.011 ENCOUNTER FOR INITIAL PRESCRIPTION OF CONTRACEPTIVE PILLS: ICD-10-CM

## 2019-02-01 DIAGNOSIS — Z00.00 ENCOUNTER FOR ROUTINE ADULT HEALTH EXAMINATION WITHOUT ABNORMAL FINDINGS: Primary | ICD-10-CM

## 2019-02-01 PROCEDURE — 87591 N.GONORRHOEAE DNA AMP PROB: CPT | Performed by: FAMILY MEDICINE

## 2019-02-01 PROCEDURE — 87491 CHLMYD TRACH DNA AMP PROBE: CPT | Performed by: FAMILY MEDICINE

## 2019-02-01 PROCEDURE — G0145 SCR C/V CYTO,THINLAYER,RESCR: HCPCS | Performed by: FAMILY MEDICINE

## 2019-02-01 PROCEDURE — 99395 PREV VISIT EST AGE 18-39: CPT | Performed by: FAMILY MEDICINE

## 2019-02-01 RX ORDER — NORGESTIMATE AND ETHINYL ESTRADIOL 7DAYSX3 LO
1 KIT ORAL DAILY
Qty: 84 TABLET | Refills: 4 | Status: SHIPPED | OUTPATIENT
Start: 2019-02-01 | End: 2020-02-01

## 2019-02-01 RX ORDER — DEXTROAMPHETAMINE SACCHARATE, AMPHETAMINE ASPARTATE, DEXTROAMPHETAMINE SULFATE AND AMPHETAMINE SULFATE 2.5; 2.5; 2.5; 2.5 MG/1; MG/1; MG/1; MG/1
10 TABLET ORAL DAILY
Qty: 30 TABLET | Refills: 0 | COMMUNITY
Start: 2019-02-01

## 2019-02-01 ASSESSMENT — PATIENT HEALTH QUESTIONNAIRE - PHQ9
5. POOR APPETITE OR OVEREATING: SEVERAL DAYS
SUM OF ALL RESPONSES TO PHQ QUESTIONS 1-9: 11

## 2019-02-01 ASSESSMENT — ANXIETY QUESTIONNAIRES
1. FEELING NERVOUS, ANXIOUS, OR ON EDGE: SEVERAL DAYS
7. FEELING AFRAID AS IF SOMETHING AWFUL MIGHT HAPPEN: NOT AT ALL
2. NOT BEING ABLE TO STOP OR CONTROL WORRYING: SEVERAL DAYS
5. BEING SO RESTLESS THAT IT IS HARD TO SIT STILL: MORE THAN HALF THE DAYS
IF YOU CHECKED OFF ANY PROBLEMS ON THIS QUESTIONNAIRE, HOW DIFFICULT HAVE THESE PROBLEMS MADE IT FOR YOU TO DO YOUR WORK, TAKE CARE OF THINGS AT HOME, OR GET ALONG WITH OTHER PEOPLE: SOMEWHAT DIFFICULT
3. WORRYING TOO MUCH ABOUT DIFFERENT THINGS: SEVERAL DAYS
GAD7 TOTAL SCORE: 8
6. BECOMING EASILY ANNOYED OR IRRITABLE: MORE THAN HALF THE DAYS

## 2019-02-01 ASSESSMENT — MIFFLIN-ST. JEOR: SCORE: 1350.98

## 2019-02-01 NOTE — NURSING NOTE
"Chief Complaint   Patient presents with     Physical     /85   Pulse 83   Temp 98.4  F (36.9  C) (Oral)   Resp 16   Ht 1.702 m (5' 7\")   Wt 57.3 kg (126 lb 6.4 oz)   SpO2 100%   BMI 19.80 kg/m   Estimated body mass index is 19.8 kg/m  as calculated from the following:    Height as of this encounter: 1.702 m (5' 7\").    Weight as of this encounter: 57.3 kg (126 lb 6.4 oz).  Medication Reconciliation: complete        Health Maintenance Due Pending Provider Review:  Pap Smear, PHQ9 and GAD7    Completed today.    Ginger Kingsley MA Lead  [facility]  [phone]  "

## 2019-02-01 NOTE — PROGRESS NOTES
SUBJECTIVE:   CC: Torie Dennis is an 23 year old woman who presents for preventive health visit.     Physical   Annual:     Getting at least 3 servings of Calcium per day:  Yes    Bi-annual eye exam:  Yes    Dental care twice a year:  Yes    Sleep apnea or symptoms of sleep apnea:  None    Diet:  Regular (no restrictions)    Frequency of exercise:  2-3 days/week    Duration of exercise:  15-30 minutes    Taking medications regularly:  Yes    Medication side effects:  Other    Additional concerns today:  Yes    PHQ-2 Total Score: 2        -------------------------------------    Today's PHQ-2 Score:   PHQ-2 ( 1999 Pfizer) 2/1/2019   Q1: Little interest or pleasure in doing things 1   Q2: Feeling down, depressed or hopeless 1   PHQ-2 Score 2   Q1: Little interest or pleasure in doing things Several days   Q2: Feeling down, depressed or hopeless Several days   PHQ-2 Score 2       Abuse: Current or Past(Physical, Sexual or Emotional)- No  Do you feel safe in your environment? Yes    Social History     Tobacco Use     Smoking status: Never Smoker     Smokeless tobacco: Never Used   Substance Use Topics     Alcohol use: Yes     Alcohol/week: 0.0 oz     Comment: socially     Alcohol Use 2/1/2019   If you drink alcohol do you typically have greater than 3 drinks per day OR greater than 7 drinks per week? No   No flowsheet data found.    Reviewed orders with patient.  Reviewed health maintenance and updated orders accordingly - Yes  Patient Active Problem List   Diagnosis     THEE (generalized anxiety disorder)     Lack of concentration     CARDIOVASCULAR SCREENING; LDL GOAL LESS THAN 160     Dysthymia     History reviewed. No pertinent surgical history.    Social History     Tobacco Use     Smoking status: Never Smoker     Smokeless tobacco: Never Used   Substance Use Topics     Alcohol use: Yes     Alcohol/week: 0.0 oz     Comment: socially     Family History   Problem Relation Age of Onset     Cancer Maternal  "Grandfather         Brain Cancer     Ovarian Cancer Paternal Grandmother            Mammogram not appropriate for this patient based on age.    Pertinent mammograms are reviewed under the imaging tab.  History of abnormal Pap smear: NO - age 21-29 PAP every 3 years recommended     Reviewed and updated as needed this visit by clinical staff  Tobacco  Allergies  Meds  Problems  Med Hx  Surg Hx  Fam Hx         Reviewed and updated as needed this visit by Provider            Review of Systems  CONSTITUTIONAL: NEGATIVE for fever, chills, change in weight  INTEGUMENTARU/SKIN: NEGATIVE for worrisome rashes, moles or lesions  EYES: NEGATIVE for vision changes or irritation  ENT: NEGATIVE for ear, mouth and throat problems  RESP: NEGATIVE for significant cough or SOB  BREAST: NEGATIVE for masses, tenderness or discharge  CV: NEGATIVE for chest pain, palpitations or peripheral edema  GI: NEGATIVE for nausea, abdominal pain, heartburn, or change in bowel habits  : NEGATIVE for unusual urinary or vaginal symptoms. Periods are regular.  MUSCULOSKELETAL: NEGATIVE for significant arthralgias or myalgia  NEURO: NEGATIVE for weakness, dizziness or paresthesias  PSYCHIATRIC: NEGATIVE for changes in mood or affect     OBJECTIVE:   /85   Pulse 83   Temp 98.4  F (36.9  C) (Oral)   Resp 16   Ht 1.702 m (5' 7\")   Wt 57.3 kg (126 lb 6.4 oz)   SpO2 100%   BMI 19.80 kg/m    Physical Exam  GENERAL: healthy, alert and no distress  EYES: Eyes grossly normal to inspection, PERRL and conjunctivae and sclerae normal  HENT: ear canals and TM's normal, nose and mouth without ulcers or lesions  NECK: no adenopathy, no asymmetry, masses, or scars and thyroid normal to palpation  RESP: lungs clear to auscultation - no rales, rhonchi or wheezes  BREAST: normal without masses, tenderness or nipple discharge and no palpable axillary masses or adenopathy  CV: regular rate and rhythm, normal S1 S2, no S3 or S4, no murmur, click or rub, " "no peripheral edema and peripheral pulses strong  ABDOMEN: soft, nontender, no hepatosplenomegaly, no masses and bowel sounds normal   (female): normal female external genitalia, normal urethral meatus, vaginal mucosa pink, moist, well rugated, and normal cervix/adnexa/uterus without masses or discharge  MS: no gross musculoskeletal defects noted, no edema  SKIN: no suspicious lesions or rashes  NEURO: Normal strength and tone, mentation intact and speech normal  PSYCH: mentation appears normal, affect normal/bright    Diagnostic Test Results:  none     ASSESSMENT/PLAN:   1. Encounter for routine adult health examination without abnormal findings  Routine screening   - Pap imaged thin layer screen reflex to HPV if ASCUS - recommend age 25 - 29  - NEISSERIA GONORRHOEA PCR  - CHLAMYDIA TRACHOMATIS PCR    2. Encounter for initial prescription of contraceptive pills  Using for acne - if not working other option is spironalactone  - norgestim-eth estrad triphasic (ORTHO TRI-CYCLEN LO) 0.18/0.215/0.25 MG-25 MCG tablet; Take 1 tablet by mouth daily  Dispense: 84 tablet; Refill: 4    COUNSELING:  Reviewed preventive health counseling, as reflected in patient instructions    BP Readings from Last 1 Encounters:   02/01/19 126/85     Estimated body mass index is 19.8 kg/m  as calculated from the following:    Height as of this encounter: 1.702 m (5' 7\").    Weight as of this encounter: 57.3 kg (126 lb 6.4 oz).    BP Screening:   Last 3 BP Readings:    BP Readings from Last 3 Encounters:   02/01/19 126/85   10/02/17 101/65   11/04/16 106/74       The following was recommended to the patient:  Re-screen BP within a year and recommended lifestyle modifications       reports that  has never smoked. she has never used smokeless tobacco.      Counseling Resources:  ATP IV Guidelines  Pooled Cohorts Equation Calculator  Breast Cancer Risk Calculator  FRAX Risk Assessment  ICSI Preventive Guidelines  Dietary Guidelines for " Americans, 2010  USDA's MyPlate  ASA Prophylaxis  Lung CA Screening    Miri Torres, [facility]

## 2019-02-02 ASSESSMENT — ANXIETY QUESTIONNAIRES: GAD7 TOTAL SCORE: 8

## 2019-02-03 LAB
C TRACH DNA SPEC QL NAA+PROBE: NEGATIVE
N GONORRHOEA DNA SPEC QL NAA+PROBE: NEGATIVE
SPECIMEN SOURCE: NORMAL
SPECIMEN SOURCE: NORMAL

## 2019-02-05 LAB
COPATH REPORT: NORMAL
PAP: NORMAL

## 2019-02-05 NOTE — RESULT ENCOUNTER NOTE
Dear Torie,   Your test results are all back -   -Chlamydia and gonnohrea tests are normal.  Let us know if you have any questions.  -Miri Torres, DO

## 2019-02-13 ENCOUNTER — TELEPHONE (OUTPATIENT)
Dept: FAMILY MEDICINE | Facility: CLINIC | Age: 24
End: 2019-02-13

## 2019-02-13 DIAGNOSIS — Z30.011 ENCOUNTER FOR INITIAL PRESCRIPTION OF CONTRACEPTIVE PILLS: Primary | ICD-10-CM

## 2019-02-13 NOTE — TELEPHONE ENCOUNTER
Reason for Call:  Other     Detailed comments: pt has some questions about her new medication norgestim-eth estrad triphasic (ORTHO TRI-CYCLEN LO.  She would like to ask about side effects and renewing the rx      Phone Number Patient can be reached at cell phone: [phone]      Best Time: any    Can we leave a detailed message on this number? YES    Call taken on 2/13/2019 at 4:26 PM by Bhakti Coley

## 2019-02-14 NOTE — TELEPHONE ENCOUNTER
PN  Pt is turning 24 in 3 weeks and will be off of parents insurance, wondering about being able to get another 3 months of BC before she is off of insurance. Change Rx? Did just get 3 month supply beginning of this month and has generic.    Please advise,  Armida Odom, RN

## 2019-02-14 NOTE — TELEPHONE ENCOUNTER
"Attempt to reach patient.  Message \"has voice mail box that has not been set up yet\"  Will try to call later.  Nadeen Swanson RN    "

## 2019-02-14 NOTE — TELEPHONE ENCOUNTER
2nd attempt to reach patient.  No answer. No VM set up.  Will wait for patient to call back.  Nadeen Swanson RN

## 2019-02-15 RX ORDER — NORGESTIMATE AND ETHINYL ESTRADIOL 7DAYSX3 28
1 KIT ORAL DAILY
Qty: 84 TABLET | Refills: 4 | Status: SHIPPED | OUTPATIENT
Start: 2019-02-15 | End: 2020-03-09

## 2019-02-15 NOTE — TELEPHONE ENCOUNTER
I switched the dose of her birth control slightly to the ortho tri-cyclen.  This is close in hormones but a change and may be covered by insurance for 3 month supply.  Can try to see if she is able to get that.  Thanks  PN

## 2019-02-17 ENCOUNTER — MYC MEDICAL ADVICE (OUTPATIENT)
Dept: FAMILY MEDICINE | Facility: CLINIC | Age: 24
End: 2019-02-17

## 2020-03-02 ENCOUNTER — HEALTH MAINTENANCE LETTER (OUTPATIENT)
Age: 25
End: 2020-03-02

## 2020-03-06 ENCOUNTER — TELEPHONE (OUTPATIENT)
Dept: FAMILY MEDICINE | Facility: CLINIC | Age: 25
End: 2020-03-06

## 2020-03-06 DIAGNOSIS — Z30.011 ENCOUNTER FOR INITIAL PRESCRIPTION OF CONTRACEPTIVE PILLS: ICD-10-CM

## 2020-03-06 NOTE — TELEPHONE ENCOUNTER
Reason for Call:  Medication or medication refill:    Do you use a [facility]?  Name of the pharmacy and phone number for the current request:       [id number] - [place], [place]  [address]      Name of the medication requested:   norgestim-eth estrad triphasic (ORTHO TRI-CYCLEN/TRI-SPRINTEC) 0.18/0.215/0.25 MG-35 MCG tablet 84        Other request: N/A    Can we leave a detailed message on this number? YES    Phone number patient can be reached at: Cell number on file:    Telephone Information:   Mobile [phone]       Best Time: any     Call taken on 3/6/2020 at 3:30 PM by Lynn Dubose

## 2020-03-09 RX ORDER — NORGESTIMATE AND ETHINYL ESTRADIOL 7DAYSX3 28
KIT ORAL
Qty: 28 TABLET | Refills: 0 | Status: SHIPPED | OUTPATIENT
Start: 2020-03-09

## 2020-03-09 NOTE — TELEPHONE ENCOUNTER
"Medication is being filled for 1 time refill only due to:  Patient needs to be seen because it has been more than one year since last visit.   Nayeli MERCADO RN    Last Written Prescription Date:  2/15/2019  Last Fill Quantity: 84,  # refills:  4  Last office visit: 2/1/2019 with prescribing provider:     Future Office Visit:     Requested Prescriptions   Pending Prescriptions Disp Refills     TRI-SPRINTEC 0.18/0.215/0.25 MG-35 MCG tablet [Pharmacy Med Name: Tri-Sprintec 0.18/0.215/0.25 MG-35 MCG Oral Tablet]  0     Sig: Take 1 tablet by mouth once daily       Contraceptives Protocol Failed - 3/6/2020  3:25 PM        Failed - Recent (12 mo) or future (30 days) visit within the authorizing provider's specialty     Patient has had an office visit with the authorizing provider or a provider within the authorizing providers department within the previous 12 mos or has a future within next 30 days. See \"Patient Info\" tab in inbasket, or \"Choose Columns\" in Meds & Orders section of the refill encounter.              Passed - Patient is not a current smoker if age is 35 or older        Passed - Medication is active on med list        Passed - No active pregnancy on record        Passed - No positive pregnancy test in past 12 months             "

## 2020-12-20 ENCOUNTER — HEALTH MAINTENANCE LETTER (OUTPATIENT)
Age: 25
End: 2020-12-20

## 2021-04-24 ENCOUNTER — HEALTH MAINTENANCE LETTER (OUTPATIENT)
Age: 26
End: 2021-04-24

## 2021-10-03 ENCOUNTER — HEALTH MAINTENANCE LETTER (OUTPATIENT)
Age: 26
End: 2021-10-03

## 2022-05-15 ENCOUNTER — HEALTH MAINTENANCE LETTER (OUTPATIENT)
Age: 27
End: 2022-05-15

## 2022-09-10 ENCOUNTER — HEALTH MAINTENANCE LETTER (OUTPATIENT)
Age: 27
End: 2022-09-10

## 2023-06-03 ENCOUNTER — HEALTH MAINTENANCE LETTER (OUTPATIENT)
Age: 28
End: 2023-06-03

## 2024-11-16 NOTE — TELEPHONE ENCOUNTER
[employer] message sent to oneal.  Nayeli MERCADO RN    
PN  Please see [employer] message below.  Thanks, Nadeen Swanson RN    
She can get testing done by anyone in her network -   She should call the behavioral health number on her insurance card.  They have a list of in-network providers and hopefully they have someone who can help sooner.  PN    
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 Recommend patient consider fluoroscopically guided steroid injection for therapeutic and diagnostic purposes. We will refer a patient to interventional pain management for this. EMG upper extremity postoperatively could also help, evaluate radiculopathy. She will have the EMG study done as well and follow up afterwards. Discussed possible further decompressive surgery (ACDF C7T1) if EMG positive and good symptomatic relief from epidural.  Prior to appointment and during encounter with patient extensive medical records were reviewed including but not limited to, hospital records, outpatient records, imaging results, and lab data. During this appointment the patient was examined, diagnoses were discussed and explained in a face to face manner. In addition extensive time was spent reviewing aforementioned diagnostic studies. Counseling including abnormal image results, differential diagnoses, treatment options, risk and benefits, lifestyle changes, current condition, and current medications was performed. Patient's comments, questions, and concerns were addressed and patient verbalized understanding. Based on this patient's presentation at our office, which is an orthopedic spine surgeon's office, this patient inherently / intrinsically has a risk, however minute, of developing issues such as Cauda equina syndrome, bowel and bladder changes, or progression of motor or neurological deficits such as paralysis which may be permanent.   I, [SHERMAN Cohen], certify that the clinical information was reviewed with Dr. Carlson, who was physically present in the office. He agreed with the above plan of care and was available for consultation as necessary during the office visit. 
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 Recommend patient consider fluoroscopically guided steroid injection for therapeutic and diagnostic purposes. We will refer a patient to interventional pain management for this. EMG upper extremity postoperatively could also help, evaluate radiculopathy. She will have the EMG study done as well and follow up afterwards. Discussed possible further decompressive surgery (ACDF C7T1) if EMG positive and good symptomatic relief from epidural.  Prior to appointment and during encounter with patient extensive medical records were reviewed including but not limited to, hospital records, outpatient records, imaging results, and lab data. During this appointment the patient was examined, diagnoses were discussed and explained in a face to face manner. In addition extensive time was spent reviewing aforementioned diagnostic studies. Counseling including abnormal image results, differential diagnoses, treatment options, risk and benefits, lifestyle changes, current condition, and current medications was performed. Patient's comments, questions, and concerns were addressed and patient verbalized understanding. Based on this patient's presentation at our office, which is an orthopedic spine surgeon's office, this patient inherently / intrinsically has a risk, however minute, of developing issues such as Cauda equina syndrome, bowel and bladder changes, or progression of motor or neurological deficits such as paralysis which may be permanent.   I, [SHERMAN Cohen], certify that the clinical information was reviewed with Dr. Carlson, who was physically present in the office. He agreed with the above plan of care and was available for consultation as necessary during the office visit. 
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 Recommend patient consider fluoroscopically guided steroid injection for therapeutic and diagnostic purposes. We will refer a patient to interventional pain management for this. EMG upper extremity postoperatively could also help, evaluate radiculopathy. She will have the EMG study done as well and follow up afterwards. Discussed possible further decompressive surgery (ACDF C7T1) if EMG positive and good symptomatic relief from epidural.  Prior to appointment and during encounter with patient extensive medical records were reviewed including but not limited to, hospital records, outpatient records, imaging results, and lab data. During this appointment the patient was examined, diagnoses were discussed and explained in a face to face manner. In addition extensive time was spent reviewing aforementioned diagnostic studies. Counseling including abnormal image results, differential diagnoses, treatment options, risk and benefits, lifestyle changes, current condition, and current medications was performed. Patient's comments, questions, and concerns were addressed and patient verbalized understanding. Based on this patient's presentation at our office, which is an orthopedic spine surgeon's office, this patient inherently / intrinsically has a risk, however minute, of developing issues such as Cauda equina syndrome, bowel and bladder changes, or progression of motor or neurological deficits such as paralysis which may be permanent.   I, [SHERMAN Cohen], certify that the clinical information was reviewed with Dr. Carlson, who was physically present in the office. He agreed with the above plan of care and was available for consultation as necessary during the office visit.